# Patient Record
Sex: FEMALE | URBAN - METROPOLITAN AREA
[De-identification: names, ages, dates, MRNs, and addresses within clinical notes are randomized per-mention and may not be internally consistent; named-entity substitution may affect disease eponyms.]

---

## 2020-12-10 ENCOUNTER — OFFICE VISIT (OUTPATIENT)
Dept: URBAN - METROPOLITAN AREA CLINIC 75 | Facility: CLINIC | Age: 75
End: 2020-12-10
Payer: COMMERCIAL

## 2020-12-10 PROCEDURE — 92004 COMPRE OPH EXAM NEW PT 1/>: CPT | Performed by: OPTOMETRIST

## 2020-12-10 ASSESSMENT — INTRAOCULAR PRESSURE
OD: 14
OS: 17

## 2020-12-10 ASSESSMENT — KERATOMETRY
OD: 41.88
OS: 43.63

## 2020-12-10 ASSESSMENT — VISUAL ACUITY
OD: 20/20
OS: 20/20

## 2020-12-10 NOTE — IMPRESSION/PLAN
Impression: Presbyopia: H52.4. Plan: order new Cl OS only 9.5 diameter blue tint aim for +2.50 Reading power Reading pair of glasses will be done once contact is finalized OD K 42/41.75
OS K 43.5/43.75

## 2021-01-07 ENCOUNTER — OFFICE VISIT (OUTPATIENT)
Dept: URBAN - METROPOLITAN AREA CLINIC 75 | Facility: CLINIC | Age: 76
End: 2021-01-07

## 2021-01-07 DIAGNOSIS — H52.4 PRESBYOPIA: Primary | ICD-10-CM

## 2021-01-07 PROCEDURE — 92310 CONTACT LENS FITTING OU: CPT | Performed by: OPTOMETRIST

## 2021-01-07 ASSESSMENT — INTRAOCULAR PRESSURE: OD: 11

## 2021-01-07 NOTE — IMPRESSION/PLAN
Impression: Presbyopia: H52.4. Plan: Pt computer distance is at 21 inches Over Refraction Today OS: -1.50 to get pt where she wants Pts current lens is over plus at near. [place] to reorder lens with same fit parameters with an OR of -1.50. Send to [facility] for pickup Current lens is a +3.25 and new lens should be a +1.75 reading lens

## 2022-01-21 ENCOUNTER — OFFICE VISIT (OUTPATIENT)
Dept: URBAN - METROPOLITAN AREA CLINIC 72 | Facility: CLINIC | Age: 77
End: 2022-01-21
Payer: COMMERCIAL

## 2022-01-21 PROCEDURE — 92012 INTRM OPH EXAM EST PATIENT: CPT | Performed by: OPTOMETRIST

## 2022-01-21 ASSESSMENT — INTRAOCULAR PRESSURE
OD: 14
OS: 13

## 2022-01-21 ASSESSMENT — VISUAL ACUITY
OD: 20/20
OS: 20/20

## 2022-01-21 NOTE — IMPRESSION/PLAN
Impression: Presbyopia: H52.4. Plan: New spec Rx given - Discussed changes and possible adaptation period. 

CL trial given today in clinic will print RX if pt likes lens

## 2023-01-23 ENCOUNTER — OFFICE VISIT (OUTPATIENT)
Dept: URBAN - METROPOLITAN AREA CLINIC 72 | Facility: CLINIC | Age: 78
End: 2023-01-23
Payer: MEDICARE

## 2023-01-23 DIAGNOSIS — H25.813 COMBINED FORMS OF AGE-RELATED CATARACT, BILATERAL: Primary | ICD-10-CM

## 2023-01-23 DIAGNOSIS — H43.813 VITREOUS DEGENERATION, BILATERAL: ICD-10-CM

## 2023-01-23 DIAGNOSIS — H43.823 VITREOMACULAR ADHESION, BILATERAL: ICD-10-CM

## 2023-01-23 PROCEDURE — 99213 OFFICE O/P EST LOW 20 MIN: CPT | Performed by: OPTOMETRIST

## 2023-01-23 ASSESSMENT — INTRAOCULAR PRESSURE
OS: 14
OD: 13